# Patient Record
Sex: MALE | Race: WHITE | NOT HISPANIC OR LATINO | Employment: OTHER | URBAN - METROPOLITAN AREA
[De-identification: names, ages, dates, MRNs, and addresses within clinical notes are randomized per-mention and may not be internally consistent; named-entity substitution may affect disease eponyms.]

---

## 2018-04-24 ENCOUNTER — ESTABLISHED (OUTPATIENT)
Dept: URBAN - METROPOLITAN AREA CLINIC 36 | Facility: CLINIC | Age: 73
End: 2018-04-24

## 2018-04-24 DIAGNOSIS — H25.10: ICD-10-CM

## 2018-04-24 DIAGNOSIS — H31.001: ICD-10-CM

## 2018-04-24 PROCEDURE — 92014 COMPRE OPH EXAM EST PT 1/>: CPT

## 2018-04-24 ASSESSMENT — VISUAL ACUITY
OD_CC: J2
OS_SC: 20/200
OS_CC: 20/20
OS_SC: J10
OS_CC: J1
OD_CC: 20/30
OD_SC: J10
OD_SC: 20/200

## 2018-04-24 ASSESSMENT — TONOMETRY
OD_IOP_MMHG: 16
OS_IOP_MMHG: 16

## 2019-08-27 ENCOUNTER — COSMETIC CONSULT (OUTPATIENT)
Dept: URBAN - METROPOLITAN AREA CLINIC 46 | Facility: CLINIC | Age: 74
End: 2019-08-27

## 2019-08-27 DIAGNOSIS — L98.8: ICD-10-CM

## 2019-08-27 PROCEDURE — 96999VL VOLLURE

## 2020-08-24 NOTE — PATIENT DISCUSSION
see #1 above.  Patient educated on condition but patient has only noticed for 3 weeks on CVF with KMS appears to be missing L half of VF OU (LEFT HOMO DILLON).  Pt says he fell twice about 3 weeks ago and went to ER then rehab and just got out of rehab about 10 days ago.  letter to PCP ed this is NOT an ocular phenomenon but rather is from the brain can see neurology if PCP desires and I will get HVF to eval and confirm the field findings.  these images patient sees are NOT disturbing but rather are neutral to positive (cats, people, etc) and are there because the patient's brain is trying to fill the lost visual area with stimuli.

## 2020-08-24 NOTE — PATIENT DISCUSSION
Patient educated on condition and will get automated HVF in very near future.  May need MRI to confirm brain findings.

## 2020-08-31 NOTE — PATIENT DISCUSSION
8/24/2020:  see #1 above.  Patient educated on condition but patient has only noticed for 3 weeks on CVF with KMS appears to be missing L half of VF OU (LEFT HOMO DILLON).  Pt says he fell twice about 3 weeks ago and went to ER then rehab and just got out of rehab about 10 days ago.  letter to PCP ed this is NOT an ocular phenomenon but rather is from the brain can see neurology if PCP desires and I will get HVF to eval and confirm the field findings.  these images patient sees are NOT disturbing but rather are neutral to positive (cats, people, etc) and are there because the patient's brain is trying to fill the lost visual area with stimuli.

## 2020-10-02 NOTE — PATIENT DISCUSSION
Retinal tear and detachment warning symptoms reviewed and patient instructed to call immediately if increasing floaters, flashes, or decreasing peripheral vision. Benzoyl Peroxide Pregnancy And Lactation Text: This medication is Pregnancy Category C. It is unknown if benzoyl peroxide is excreted in breast milk.

## 2020-11-09 ENCOUNTER — ESTABLISHED COMPREHENSIVE EXAM (OUTPATIENT)
Dept: URBAN - METROPOLITAN AREA CLINIC 46 | Facility: CLINIC | Age: 75
End: 2020-11-09

## 2020-11-09 DIAGNOSIS — H25.813: ICD-10-CM

## 2020-11-09 DIAGNOSIS — H31.001: ICD-10-CM

## 2020-11-09 DIAGNOSIS — H40.033: ICD-10-CM

## 2020-11-09 DIAGNOSIS — H04.123: ICD-10-CM

## 2020-11-09 PROCEDURE — A4263 PERMANENT TEAR DUCT PLUG: HCPCS

## 2020-11-09 PROCEDURE — 92014 COMPRE OPH EXAM EST PT 1/>: CPT

## 2020-11-09 PROCEDURE — 68761S PUNCTUM PLUG / SILICONE,EACH

## 2020-11-09 ASSESSMENT — VISUAL ACUITY
OD_CC: J1
OS_OTHER: FL-.25
OS_BAT: 20/80
OS_CC: J1
OS_CC: 20/25
OD_CC: 20/25
OD_OTHER: FL-.25
OD_BAT: 20/70

## 2020-11-09 ASSESSMENT — TONOMETRY
OS_IOP_MMHG: 16
OD_IOP_MMHG: 15

## 2022-11-04 ENCOUNTER — COMPREHENSIVE EXAM (OUTPATIENT)
Dept: URBAN - METROPOLITAN AREA CLINIC 46 | Facility: CLINIC | Age: 77
End: 2022-11-04

## 2022-11-04 DIAGNOSIS — H25.813: ICD-10-CM

## 2022-11-04 DIAGNOSIS — H40.033: ICD-10-CM

## 2022-11-04 DIAGNOSIS — H52.7: ICD-10-CM

## 2022-11-04 DIAGNOSIS — H25.13: ICD-10-CM

## 2022-11-04 DIAGNOSIS — L98.8: ICD-10-CM

## 2022-11-04 PROCEDURE — 92014 COMPRE OPH EXAM EST PT 1/>: CPT

## 2022-11-04 PROCEDURE — 92015 DETERMINE REFRACTIVE STATE: CPT

## 2022-11-04 RX ORDER — PREDNISOLONE ACETATE 10 MG/ML: 1 SUSPENSION/ DROPS OPHTHALMIC

## 2022-11-04 ASSESSMENT — TONOMETRY
OD_IOP_MMHG: 15
OS_IOP_MMHG: 14

## 2022-11-04 ASSESSMENT — VISUAL ACUITY
OS_CC: J1+
OS_CC: 20/20
OD_CC: J1+
OD_CC: 20/25

## 2023-05-15 ENCOUNTER — ESTABLISHED PATIENT (OUTPATIENT)
Dept: URBAN - METROPOLITAN AREA CLINIC 46 | Facility: CLINIC | Age: 78
End: 2023-05-15

## 2023-05-15 DIAGNOSIS — H25.813: ICD-10-CM

## 2023-05-15 DIAGNOSIS — H40.033: ICD-10-CM

## 2023-05-15 DIAGNOSIS — H31.001: ICD-10-CM

## 2023-05-15 DIAGNOSIS — H04.123: ICD-10-CM

## 2023-05-15 DIAGNOSIS — H52.7: ICD-10-CM

## 2023-05-15 DIAGNOSIS — H25.13: ICD-10-CM

## 2023-05-15 DIAGNOSIS — L98.8: ICD-10-CM

## 2023-05-15 DIAGNOSIS — H43.813: ICD-10-CM

## 2023-05-15 PROCEDURE — 92014 COMPRE OPH EXAM EST PT 1/>: CPT

## 2023-05-15 ASSESSMENT — VISUAL ACUITY
OD_CC: J2
OU_CC: J1+
OD_CC: 20/20
OS_CC: J1
OU_CC: 20/15
OS_CC: 20/15

## 2023-05-15 ASSESSMENT — TONOMETRY
OS_IOP_MMHG: 16
OD_IOP_MMHG: 17

## 2023-06-23 ENCOUNTER — CONSULTATION/EVALUATION (OUTPATIENT)
Dept: URBAN - METROPOLITAN AREA CLINIC 46 | Facility: CLINIC | Age: 78
End: 2023-06-23

## 2023-06-23 DIAGNOSIS — H25.813: ICD-10-CM

## 2023-06-23 DIAGNOSIS — H40.033: ICD-10-CM

## 2023-06-23 DIAGNOSIS — H43.813: ICD-10-CM

## 2023-06-23 DIAGNOSIS — H52.7: ICD-10-CM

## 2023-06-23 DIAGNOSIS — H31.001: ICD-10-CM

## 2023-06-23 DIAGNOSIS — H25.13: ICD-10-CM

## 2023-06-23 DIAGNOSIS — L98.8: ICD-10-CM

## 2023-06-23 DIAGNOSIS — H04.123: ICD-10-CM

## 2023-06-23 PROCEDURE — 92025 CPTRIZED CORNEAL TOPOGRAPHY: CPT

## 2023-06-23 PROCEDURE — 92020 GONIOSCOPY: CPT

## 2023-06-23 PROCEDURE — V2799PMN IMPRIMIS PRED-MOXI-NEPAF 5ML

## 2023-06-23 PROCEDURE — 92004 COMPRE OPH EXAM NEW PT 1/>: CPT

## 2023-06-23 PROCEDURE — 92136TC INTERFEROMETRY - TECHNICAL COMPONENT

## 2023-06-23 ASSESSMENT — VISUAL ACUITY
OD_SC: 20/400
OS_BAT: 20/70
OS_SC: >J12
OD_CC: J2
OS_CC: 20/20-1
OD_SC: >J12
OD_CC: 20/20-2
OS_SC: 20/100
OS_CC: J1
OD_BAT: 20/70

## 2023-06-23 ASSESSMENT — TONOMETRY
OS_IOP_MMHG: 14
OD_IOP_MMHG: 14

## 2023-08-15 ENCOUNTER — SURGERY/PROCEDURE (OUTPATIENT)
Dept: URBAN - METROPOLITAN AREA CLINIC 46 | Facility: CLINIC | Age: 78
End: 2023-08-15

## 2023-08-15 ENCOUNTER — PRE-OP/H&P (OUTPATIENT)
Dept: URBAN - METROPOLITAN AREA CLINIC 39 | Facility: CLINIC | Age: 78
End: 2023-08-15

## 2023-08-15 DIAGNOSIS — H31.001: ICD-10-CM

## 2023-08-15 DIAGNOSIS — H21.81: ICD-10-CM

## 2023-08-15 DIAGNOSIS — H27.8: ICD-10-CM

## 2023-08-15 DIAGNOSIS — H25.813: ICD-10-CM

## 2023-08-15 DIAGNOSIS — H04.123: ICD-10-CM

## 2023-08-15 DIAGNOSIS — H40.033: ICD-10-CM

## 2023-08-15 DIAGNOSIS — H43.813: ICD-10-CM

## 2023-08-15 PROCEDURE — 66984 XCAPSL CTRC RMVL W/O ECP: CPT

## 2023-08-15 PROCEDURE — 99211HP PRE-OP

## 2023-08-15 PROCEDURE — 65772LRI LRI DURING CAT SX

## 2023-08-16 ENCOUNTER — POST-OP (OUTPATIENT)
Dept: URBAN - METROPOLITAN AREA CLINIC 46 | Facility: CLINIC | Age: 78
End: 2023-08-16

## 2023-08-16 DIAGNOSIS — Z96.1: ICD-10-CM

## 2023-08-16 PROCEDURE — 99024 POSTOP FOLLOW-UP VISIT: CPT

## 2023-08-16 ASSESSMENT — TONOMETRY
OD_IOP_MMHG: 12
OS_IOP_MMHG: 15

## 2023-08-16 ASSESSMENT — VISUAL ACUITY
OD_PH: 20/15
OS_SC: 20/200
OS_PH: 20/40
OD_SC: 20/20-1

## 2023-08-18 ENCOUNTER — POST OP/EVAL OF SECOND EYE (OUTPATIENT)
Dept: URBAN - METROPOLITAN AREA CLINIC 47 | Facility: CLINIC | Age: 78
End: 2023-08-18

## 2023-08-18 DIAGNOSIS — H04.123: ICD-10-CM

## 2023-08-18 DIAGNOSIS — H40.033: ICD-10-CM

## 2023-08-18 DIAGNOSIS — H25.812: ICD-10-CM

## 2023-08-18 DIAGNOSIS — Z96.1: ICD-10-CM

## 2023-08-18 PROCEDURE — 92012 INTRM OPH EXAM EST PATIENT: CPT

## 2023-08-18 PROCEDURE — 99024 POSTOP FOLLOW-UP VISIT: CPT

## 2023-08-18 ASSESSMENT — VISUAL ACUITY
OS_SC: 20/100
OS_BAT: 20/70
OS_CC: J1
OS_SC: >J12
OS_CC: 20/20-1
OD_SC: 20/20

## 2023-08-18 ASSESSMENT — TONOMETRY
OS_IOP_MMHG: 12
OD_IOP_MMHG: 14

## 2023-08-21 ENCOUNTER — POST-OP (OUTPATIENT)
Dept: URBAN - METROPOLITAN AREA SURGERY 14 | Facility: SURGERY | Age: 78
End: 2023-08-21

## 2023-08-21 ENCOUNTER — SURGERY/PROCEDURE (OUTPATIENT)
Dept: URBAN - METROPOLITAN AREA CLINIC 46 | Facility: CLINIC | Age: 78
End: 2023-08-21

## 2023-08-21 ENCOUNTER — PRE-OP/H&P (OUTPATIENT)
Dept: URBAN - METROPOLITAN AREA CLINIC 39 | Facility: CLINIC | Age: 78
End: 2023-08-21

## 2023-08-21 DIAGNOSIS — H25.812: ICD-10-CM

## 2023-08-21 DIAGNOSIS — Z96.1: ICD-10-CM

## 2023-08-21 PROCEDURE — 99211HP PRE-OP

## 2023-08-21 PROCEDURE — 65772LRI LRI DURING CAT SX

## 2023-08-21 PROCEDURE — 66984 XCAPSL CTRC RMVL W/O ECP: CPT

## 2023-08-21 ASSESSMENT — TONOMETRY: OS_IOP_MMHG: 18

## 2023-08-21 ASSESSMENT — VISUAL ACUITY
OS_SC: 20/60
OS_PH: 20/40+1

## 2023-08-23 ENCOUNTER — POST-OP (OUTPATIENT)
Dept: URBAN - METROPOLITAN AREA CLINIC 46 | Facility: CLINIC | Age: 78
End: 2023-08-23

## 2023-08-23 DIAGNOSIS — Z96.1: ICD-10-CM

## 2023-08-23 PROCEDURE — 99024 POSTOP FOLLOW-UP VISIT: CPT

## 2023-08-23 ASSESSMENT — VISUAL ACUITY
OS_PH: 20/40
OU_SC: 20/15
OU_SC: J2
OD_SC: J6
OS_SC: J3
OD_SC: 20/15
OS_SC: 20/80

## 2023-08-23 ASSESSMENT — TONOMETRY
OS_IOP_MMHG: 12
OD_IOP_MMHG: 13

## 2023-09-07 ENCOUNTER — POST-OP (OUTPATIENT)
Dept: URBAN - METROPOLITAN AREA CLINIC 46 | Facility: CLINIC | Age: 78
End: 2023-09-07

## 2023-09-07 DIAGNOSIS — Z96.1: ICD-10-CM

## 2023-09-07 PROCEDURE — 99024 POSTOP FOLLOW-UP VISIT: CPT

## 2023-09-07 ASSESSMENT — VISUAL ACUITY
OU_SC: J1
OS_SC: J2
OU_SC: 20/15
OD_SC: 20/15
OD_SC: J8
OS_SC: 20/80

## 2023-09-07 ASSESSMENT — TONOMETRY
OD_IOP_MMHG: 11
OS_IOP_MMHG: 12

## 2024-03-11 ENCOUNTER — CONTACT LENSES/GLASSES VISIT (OUTPATIENT)
Dept: URBAN - METROPOLITAN AREA CLINIC 46 | Facility: CLINIC | Age: 79
End: 2024-03-11

## 2024-03-11 DIAGNOSIS — Z96.1: ICD-10-CM

## 2024-03-11 PROCEDURE — 92015GRNC REFRACTION GLASSES RECHECK NO CHARGE

## 2024-03-11 ASSESSMENT — VISUAL ACUITY
OS_SC: 20/40-2
OU_SC: 20/30+1
OD_SC: 20/70

## 2024-09-30 ENCOUNTER — CONTACT LENSES/GLASSES VISIT (OUTPATIENT)
Dept: URBAN - METROPOLITAN AREA CLINIC 46 | Facility: CLINIC | Age: 79
End: 2024-09-30

## 2024-09-30 DIAGNOSIS — Z96.1: ICD-10-CM

## 2024-09-30 PROCEDURE — 92015GRNC REFRACTION GLASSES RECHECK - NO CHARGE

## 2025-01-24 ENCOUNTER — COMPREHENSIVE EXAM (OUTPATIENT)
Age: 80
End: 2025-01-24

## 2025-01-24 DIAGNOSIS — Z96.1: ICD-10-CM

## 2025-01-24 DIAGNOSIS — H26.493: ICD-10-CM

## 2025-01-24 PROCEDURE — 99213 OFFICE O/P EST LOW 20 MIN: CPT

## 2025-02-07 ENCOUNTER — CONSULTATION/EVALUATION (OUTPATIENT)
Age: 80
End: 2025-02-07

## 2025-02-07 DIAGNOSIS — H26.493: ICD-10-CM

## 2025-02-07 PROCEDURE — 92004 COMPRE OPH EXAM NEW PT 1/>: CPT

## 2025-02-07 PROCEDURE — 66821 AFTER CATARACT LASER SURGERY: CPT | Mod: 50

## 2025-02-24 ENCOUNTER — POST-OP (OUTPATIENT)
Age: 80
End: 2025-02-24

## 2025-02-24 DIAGNOSIS — Z98.890: ICD-10-CM

## 2025-02-24 DIAGNOSIS — Z96.1: ICD-10-CM

## 2025-02-24 PROCEDURE — 99024 POSTOP FOLLOW-UP VISIT: CPT
